# Patient Record
Sex: MALE | Race: ASIAN | NOT HISPANIC OR LATINO | Employment: FULL TIME | URBAN - METROPOLITAN AREA
[De-identification: names, ages, dates, MRNs, and addresses within clinical notes are randomized per-mention and may not be internally consistent; named-entity substitution may affect disease eponyms.]

---

## 2017-12-31 ENCOUNTER — APPOINTMENT (EMERGENCY)
Dept: CT IMAGING | Facility: HOSPITAL | Age: 52
End: 2017-12-31
Payer: COMMERCIAL

## 2017-12-31 ENCOUNTER — HOSPITAL ENCOUNTER (EMERGENCY)
Facility: HOSPITAL | Age: 52
Discharge: HOME | End: 2017-12-31
Attending: EMERGENCY MEDICINE | Admitting: EMERGENCY MEDICINE
Payer: COMMERCIAL

## 2017-12-31 VITALS
SYSTOLIC BLOOD PRESSURE: 120 MMHG | HEIGHT: 66 IN | BODY MASS INDEX: 27 KG/M2 | WEIGHT: 168 LBS | OXYGEN SATURATION: 98 % | RESPIRATION RATE: 16 BRPM | DIASTOLIC BLOOD PRESSURE: 79 MMHG | HEART RATE: 74 BPM | TEMPERATURE: 97.2 F

## 2017-12-31 DIAGNOSIS — K62.5 BRIGHT RED RECTAL BLEEDING: Primary | ICD-10-CM

## 2017-12-31 LAB
ABO GROUP BLD: NORMAL
ALBUMIN SERPL BCP-MCNC: 4.2 G/DL (ref 3.5–5)
ALP SERPL-CCNC: 55 U/L (ref 46–116)
ALT SERPL W P-5'-P-CCNC: 31 U/L (ref 12–78)
ANION GAP SERPL CALCULATED.3IONS-SCNC: 6 MMOL/L (ref 4–13)
APTT PPP: 27 SECONDS (ref 23–35)
AST SERPL W P-5'-P-CCNC: 19 U/L (ref 5–45)
BASOPHILS # BLD AUTO: 0.04 THOUSANDS/ΜL (ref 0–0.1)
BASOPHILS NFR BLD AUTO: 1 % (ref 0–1)
BILIRUB SERPL-MCNC: 0.6 MG/DL (ref 0.2–1)
BILIRUB UR QL STRIP: NEGATIVE
BLD GP AB SCN SERPL QL: NEGATIVE
BUN SERPL-MCNC: 14 MG/DL (ref 5–25)
CALCIUM SERPL-MCNC: 9.1 MG/DL (ref 8.3–10.1)
CHLORIDE SERPL-SCNC: 104 MMOL/L (ref 100–108)
CLARITY UR: CLEAR
CO2 SERPL-SCNC: 31 MMOL/L (ref 21–32)
COLOR UR: YELLOW
CREAT SERPL-MCNC: 0.83 MG/DL (ref 0.6–1.3)
EOSINOPHIL # BLD AUTO: 0.12 THOUSAND/ΜL (ref 0–0.61)
EOSINOPHIL NFR BLD AUTO: 2 % (ref 0–6)
ERYTHROCYTE [DISTWIDTH] IN BLOOD BY AUTOMATED COUNT: 13.1 % (ref 11.6–15.1)
GFR SERPL CREATININE-BSD FRML MDRD: 100 ML/MIN/1.73SQ M
GLUCOSE SERPL-MCNC: 102 MG/DL (ref 65–140)
GLUCOSE UR STRIP-MCNC: NEGATIVE MG/DL
HCT VFR BLD AUTO: 46.6 % (ref 36.5–49.3)
HGB BLD-MCNC: 15.2 G/DL (ref 12–17)
HGB UR QL STRIP.AUTO: NEGATIVE
INR PPP: 0.95 (ref 0.86–1.16)
KETONES UR STRIP-MCNC: NEGATIVE MG/DL
LACTATE SERPL-SCNC: 1.4 MMOL/L (ref 0.5–2)
LEUKOCYTE ESTERASE UR QL STRIP: NEGATIVE
LIPASE SERPL-CCNC: 89 U/L (ref 73–393)
LYMPHOCYTES # BLD AUTO: 2.11 THOUSANDS/ΜL (ref 0.6–4.47)
LYMPHOCYTES NFR BLD AUTO: 33 % (ref 14–44)
MCH RBC QN AUTO: 27.7 PG (ref 26.8–34.3)
MCHC RBC AUTO-ENTMCNC: 32.6 G/DL (ref 31.4–37.4)
MCV RBC AUTO: 85 FL (ref 82–98)
MONOCYTES # BLD AUTO: 0.53 THOUSAND/ΜL (ref 0.17–1.22)
MONOCYTES NFR BLD AUTO: 8 % (ref 4–12)
NEUTROPHILS # BLD AUTO: 3.53 THOUSANDS/ΜL (ref 1.85–7.62)
NEUTS SEG NFR BLD AUTO: 56 % (ref 43–75)
NITRITE UR QL STRIP: NEGATIVE
NRBC BLD AUTO-RTO: 0 /100 WBCS
PH UR STRIP.AUTO: 7.5 [PH] (ref 4.5–8)
PLATELET # BLD AUTO: 208 THOUSANDS/UL (ref 149–390)
PMV BLD AUTO: 9.9 FL (ref 8.9–12.7)
POTASSIUM SERPL-SCNC: 3.9 MMOL/L (ref 3.5–5.3)
PROT SERPL-MCNC: 7.1 G/DL (ref 6.4–8.2)
PROT UR STRIP-MCNC: NEGATIVE MG/DL
PROTHROMBIN TIME: 12.9 SECONDS (ref 12.1–14.4)
RBC # BLD AUTO: 5.49 MILLION/UL (ref 3.88–5.62)
RH BLD: POSITIVE
SODIUM SERPL-SCNC: 141 MMOL/L (ref 136–145)
SP GR UR STRIP.AUTO: 1.01 (ref 1–1.03)
SPECIMEN EXPIRATION DATE: NORMAL
UROBILINOGEN UR QL STRIP.AUTO: 0.2 E.U./DL
WBC # BLD AUTO: 6.35 THOUSAND/UL (ref 4.31–10.16)

## 2017-12-31 PROCEDURE — 81003 URINALYSIS AUTO W/O SCOPE: CPT | Performed by: EMERGENCY MEDICINE

## 2017-12-31 PROCEDURE — 83605 ASSAY OF LACTIC ACID: CPT | Performed by: EMERGENCY MEDICINE

## 2017-12-31 PROCEDURE — 99285 EMERGENCY DEPT VISIT HI MDM: CPT

## 2017-12-31 PROCEDURE — 36415 COLL VENOUS BLD VENIPUNCTURE: CPT | Performed by: EMERGENCY MEDICINE

## 2017-12-31 PROCEDURE — 86900 BLOOD TYPING SEROLOGIC ABO: CPT | Performed by: EMERGENCY MEDICINE

## 2017-12-31 PROCEDURE — 85025 COMPLETE CBC W/AUTO DIFF WBC: CPT | Performed by: EMERGENCY MEDICINE

## 2017-12-31 PROCEDURE — 83690 ASSAY OF LIPASE: CPT | Performed by: EMERGENCY MEDICINE

## 2017-12-31 PROCEDURE — 85730 THROMBOPLASTIN TIME PARTIAL: CPT | Performed by: EMERGENCY MEDICINE

## 2017-12-31 PROCEDURE — 74177 CT ABD & PELVIS W/CONTRAST: CPT

## 2017-12-31 PROCEDURE — 80053 COMPREHEN METABOLIC PANEL: CPT | Performed by: EMERGENCY MEDICINE

## 2017-12-31 PROCEDURE — 86850 RBC ANTIBODY SCREEN: CPT | Performed by: EMERGENCY MEDICINE

## 2017-12-31 PROCEDURE — 96360 HYDRATION IV INFUSION INIT: CPT

## 2017-12-31 PROCEDURE — 86901 BLOOD TYPING SEROLOGIC RH(D): CPT | Performed by: EMERGENCY MEDICINE

## 2017-12-31 PROCEDURE — 85610 PROTHROMBIN TIME: CPT | Performed by: EMERGENCY MEDICINE

## 2017-12-31 RX ADMIN — IOHEXOL 100 ML: 350 INJECTION, SOLUTION INTRAVENOUS at 13:07

## 2017-12-31 RX ADMIN — SODIUM CHLORIDE 1000 ML: 0.9 INJECTION, SOLUTION INTRAVENOUS at 12:32

## 2017-12-31 NOTE — DISCHARGE INSTRUCTIONS
Rectal Bleeding   WHAT YOU NEED TO KNOW:   Rectal bleeding can be caused by constipation, hemorrhoids, or anal fissures  It may also be caused by polyps, tumors, or medical conditions, such as colitis or diverticulitis  DISCHARGE INSTRUCTIONS:   Medicines:   · Pain medicine: You may be given medicine to take away or decrease pain  Do not wait until the pain is severe before you take your medicine  · Iron supplement:  Iron helps your body make more red blood cells  · Steroids: This medicine decreases inflammation in your rectum  It may be applied as a cream, ointment, or lotion  · Take your medicine as directed  Contact your healthcare provider if you think your medicine is not helping or if you have side effects  Tell him or her if you are allergic to any medicine  Keep a list of the medicines, vitamins, and herbs you take  Include the amounts, and when and why you take them  Bring the list or the pill bottles to follow-up visits  Carry your medicine list with you in case of an emergency  Follow up with your healthcare provider as directed:  Write down your questions so you remember to ask them during your visits  Drink liquids as directed:  Ask your healthcare provider how much liquid to drink each day and which liquids are best for you  This will help prevent dehydration and constipation  Contact your healthcare provider if:   · You have a fever  · Your rectal bleeding stopped for a time, but has started again  · You have nausea  · You have cold, sweaty, pale skin  · You have changes in your bowel movements, such as diarrhea  · You have questions or concerns about your condition or care  Seek care immediately or call 911 if:   · You are breathing faster than usual     · You are dizzy, lightheaded, or feel faint  · You are confused or cannot think clearly  · You urinate less than usual or not at all  · Your rectal bleeding is constant or heavy      · You have severe abdominal pain or cramping  © 2017 [address] [facility] Information is for End User's use only and may not be sold, redistributed or otherwise used for commercial purposes  All illustrations and images included in CareNotes® are the copyrighted property of A D A M [employer] Inc  or Kayden Quintero  The above information is an  only  It is not intended as medical advice for individual conditions or treatments  Talk to your doctor, nurse or pharmacist before following any medical regimen to see if it is safe and effective for you

## 2017-12-31 NOTE — ED PROVIDER NOTES
History  Chief Complaint   Patient presents with    Black or Bloody Stool     patient is c/o bright red blood in his stool  began yesterday  denies abd pain     Patient is a 49-year-old male with past medical history of hypertension and no significant past surgical history, presenting to the emergency department complaining of bright red blood per rectum  Patient states when he had a bowel movement yesterday he also had bright red blood per rectum that filled the toilet bowl  He did have another bowel movement after that yesterday which was nonbloody  This morning, he had another bowel movement and noticed a large amount of rectal bleeding after the bowel movement  Patient took pictures and it does appear that there is bright red blood filling the toilet bowl along with brown formed stool  Patient denies feeling constipated or straining  He denies any rectal pain or abdominal pain associated with rectal bleeding  He denies having this type of bleeding in the past   He denies any other associated symptoms  No fever, chills, headache, dizziness or near syncope, weakness or lethargy, URI symptoms or cough, diaphoresis, palpitations, shortness of breath, chest pain, abdominal pain, nausea, vomiting, diarrhea, constipation, melena, dysuria, frequency, hematuria, flank pain, skin rash or color change, extremity weakness or paresthesia or other focal neurologic deficits  He denies being on any blood thinners  He last had a colonoscopy 2 5 years ago and states they only found a couple of polyps, otherwise it was normal  He reports over the past 1 week he started drinking a gin and tonic, 1 glass at night  No other significant social changes  No unusual food intake  No recent travel outside the country  No significant family history of colon cancer          History provided by:  Patient   used: No    Black or Bloody Stool   Associated symptoms: no abdominal pain, no dizziness, no fever, no light-headedness and no vomiting        None       Past Medical History:   Diagnosis Date    Hypertension        History reviewed  No pertinent surgical history  History reviewed  No pertinent family history  I have reviewed and agree with the history as documented  Social History   Substance Use Topics    Smoking status: Never Smoker    Smokeless tobacco: Never Used    Alcohol use No        Review of Systems   Constitutional: Negative for chills, diaphoresis, fatigue and fever  HENT: Negative for congestion, ear pain, rhinorrhea and sore throat  Eyes: Negative for photophobia, pain and visual disturbance  Respiratory: Negative for cough, shortness of breath and wheezing  Cardiovascular: Negative for chest pain and palpitations  Gastrointestinal: Positive for blood in stool  Negative for abdominal distention, abdominal pain, constipation, diarrhea, nausea and vomiting  Genitourinary: Negative for difficulty urinating, dysuria, flank pain, frequency and hematuria  Musculoskeletal: Negative for back pain, neck pain and neck stiffness  Skin: Negative for color change, pallor, rash and wound  Allergic/Immunologic: Negative for immunocompromised state  Neurological: Negative for dizziness, syncope, weakness, light-headedness, numbness and headaches  Hematological: Negative for adenopathy  Does not bruise/bleed easily  Psychiatric/Behavioral: Negative for confusion and decreased concentration  All other systems reviewed and are negative        Physical Exam  ED Triage Vitals [12/31/17 1044]   Temperature Pulse Respirations Blood Pressure SpO2   (!) 97 2 °F (36 2 °C) 95 16 153/96 98 %      Temp Source Heart Rate Source Patient Position - Orthostatic VS BP Location FiO2 (%)   Temporal Monitor Sitting Right arm --      Pain Score       No Pain         Vitals:    12/31/17 1044 12/31/17 1250   BP: 153/96 120/79   Pulse: 95 74   Resp: 16 16   Temp: (!) 97 2 °F (36 2 °C)    TempSrc: Temporal SpO2: 98% 98%   Weight: 76 2 kg (168 lb)    Height: 5' 6" (1 676 m)      Physical Exam   Constitutional: He is oriented to person, place, and time  He appears well-developed and well-nourished  No distress  HENT:   Head: Normocephalic and atraumatic  Mouth/Throat: Oropharynx is clear and moist  No oropharyngeal exudate  Eyes: Conjunctivae and EOM are normal  Pupils are equal, round, and reactive to light  Neck: Normal range of motion  Neck supple  No JVD present  Cardiovascular: Normal rate, regular rhythm, normal heart sounds and intact distal pulses  Exam reveals no gallop and no friction rub  No murmur heard  Pulmonary/Chest: Effort normal and breath sounds normal  No respiratory distress  He has no wheezes  He has no rales  He exhibits no tenderness  Abdominal: Soft  Bowel sounds are normal  He exhibits no distension and no mass  There is no tenderness  There is no rebound and no guarding  Genitourinary: Rectal exam shows guaiac positive stool  Genitourinary Comments: On rectal exam, no palpable hemorrhoids  Stool is light brown in color and strongly guaiac positive  Musculoskeletal: Normal range of motion  He exhibits no edema or tenderness  Lymphadenopathy:     He has no cervical adenopathy  Neurological: He is alert and oriented to person, place, and time  No gross motor or sensory deficits  Skin: Skin is warm and dry  No rash noted  He is not diaphoretic  No pallor  Psychiatric: He has a normal mood and affect  His behavior is normal    Nursing note and vitals reviewed        ED Medications  Medications   sodium chloride 0 9 % bolus 1,000 mL (1,000 mL Intravenous New Bag 12/31/17 1232)   iohexol (OMNIPAQUE) 350 MG/ML injection (MULTI-DOSE) 100 mL (100 mL Intravenous Given 12/31/17 1307)       Diagnostic Studies  Results Reviewed     Procedure Component Value Units Date/Time    Protime-INR [[id number]]  (Normal) Collected:  12/31/17 1227    Lab Status:  Final result Specimen: Blood from Arm, Right Updated:  12/31/17 1317     Protime 12 9 seconds      INR 0 95    APTT [[id number]]  (Normal) Collected:  12/31/17 1227    Lab Status:  Final result Specimen:  Blood from Arm, Right Updated:  12/31/17 1317     PTT 27 seconds     Narrative: Therapeutic Heparin Range = 60-90 seconds    Lactic acid, plasma [[id number]]  (Normal) Collected:  12/31/17 1227    Lab Status:  Final result Specimen:  Blood from Arm, Right Updated:  12/31/17 1258     LACTIC ACID 1 4 mmol/L     Narrative:         Result may be elevated if tourniquet was used during collection  Comprehensive metabolic panel [[id number]] Collected:  12/31/17 1227    Lab Status:  Final result Specimen:  Blood from Arm, Right Updated:  12/31/17 1253     Sodium 141 mmol/L      Potassium 3 9 mmol/L      Chloride 104 mmol/L      CO2 31 mmol/L      Anion Gap 6 mmol/L      BUN 14 mg/dL      Creatinine 0 83 mg/dL      Glucose 102 mg/dL      Calcium 9 1 mg/dL      AST 19 U/L      ALT 31 U/L      Alkaline Phosphatase 55 U/L      Total Protein 7 1 g/dL      Albumin 4 2 g/dL      Total Bilirubin 0 60 mg/dL      eGFR 100 ml/min/1 73sq m     Narrative:         National Kidney Disease Education Program recommendations are as follows:  GFR calculation is accurate only with a steady state creatinine  Chronic Kidney disease less than 60 ml/min/1 73 sq  meters  Kidney failure less than 15 ml/min/1 73 sq  meters      Lipase [[id number]]  (Normal) Collected:  12/31/17 1227    Lab Status:  Final result Specimen:  Blood from Arm, Right Updated:  12/31/17 1253     Lipase 89 u/L     CBC and differential [[id number]]  (Normal) Collected:  12/31/17 1227    Lab Status:  Final result Specimen:  Blood from Arm, Right Updated:  12/31/17 1235     WBC 6 35 Thousand/uL      RBC 5 49 Million/uL      Hemoglobin 15 2 g/dL      Hematocrit 46 6 %      MCV 85 fL      MCH 27 7 pg      MCHC 32 6 g/dL      RDW 13 1 %      MPV 9 9 fL      Platelets 683 Thousands/uL      nRBC 0 /100 WBCs      Neutrophils Relative 56 %      Lymphocytes Relative 33 %      Monocytes Relative 8 %      Eosinophils Relative 2 %      Basophils Relative 1 %      Neutrophils Absolute 3 53 Thousands/µL      Lymphocytes Absolute 2 11 Thousands/µL      Monocytes Absolute 0 53 Thousand/µL      Eosinophils Absolute 0 12 Thousand/µL      Basophils Absolute 0 04 Thousands/µL     UA w Reflex to Microscopic [[id number]]  (Normal) Collected:  12/31/17 1225    Lab Status:  Final result Specimen:  Urine from Urine, Clean Catch Updated:  12/31/17 1231     Color, UA Yellow     Clarity, UA Clear     Specific Gravity, UA 1 015     pH, UA 7 5     Leukocytes, UA Negative     Nitrite, UA Negative     Protein, UA Negative mg/dl      Glucose, UA Negative mg/dl      Ketones, UA Negative mg/dl      Urobilinogen, UA 0 2 E U /dl      Bilirubin, UA Negative     Blood, UA Negative                 CT abdomen pelvis with contrast   Final Result by Rebeca Flores MD (12/31 1340)      No acute inflammatory findings within the abdomen or pelvis  No bowel findings  Workstation performed: [id number]                    Procedures  Procedures       Phone Contacts  ED Phone Contact    ED Course  ED Course as of Dec 31 1421   Sun Dec 31, 2017   1252 Hemoglobin: 15 2   1418 Updated patient about normal workup  Recommended he be evaluated as an outpatient by a GI specialist to get a repeat colonoscopy  Patient is from [place] but works in [place]  Will give referral for [facility] but advised him that he can also follow up with his family doctor or a GI specialist in [place]  Discussed ED return parameters  MDM  Number of Diagnoses or Management Options  Diagnosis management comments: Painless rectal bleeding x2 days  Differential includes ruptured internal hemorrhoid, AVM, diverticulosis, malignancy/colon cancer, ischemic colitis however less likely in the absence of abdominal pain  Will check abdominal labs and obtain a CT scan of the abdomen and pelvis  Will also check type and screen in case patient anemic  If workup unremarkable and hemoglobin stable, will likely discharge to follow up with a GI specialist for repeat colonoscopy  Amount and/or Complexity of Data Reviewed  Clinical lab tests: ordered and reviewed  Tests in the radiology section of CPT®: ordered and reviewed  Independent visualization of images, tracings, or specimens: yes      CritCare Time    Disposition  Final diagnoses:   Bright red rectal bleeding     Time reflects when diagnosis was documented in both MDM as applicable and the Disposition within this note     Time User Action Codes Description Comment    12/31/2017  2:19 PM Prema DWYER Add [K62 5] Bright red rectal bleeding       ED Disposition     ED Disposition Condition Comment    Discharge  Gee Clark discharge to home/self care  Condition at discharge: Stable        Follow-up Information     Follow up With Specialties Details Why Contact Info Additional Information    Family doctor  Schedule an appointment as soon as possible for a visit       Mile Granda MD Gastroenterology Schedule an appointment as soon as possible for a visit  [address] Emergency Department Emergency Medicine Go to If symptoms worsen 100 34 [facility] [address] [place] ED, [address] [facility], [address] [place] [address], [phone]        Patient's Medications    No medications on file     No discharge procedures on file      ED Provider  Electronically Signed by           Guanaco Jacob DO  12/31/17 1232